# Patient Record
Sex: FEMALE | Race: WHITE | URBAN - METROPOLITAN AREA
[De-identification: names, ages, dates, MRNs, and addresses within clinical notes are randomized per-mention and may not be internally consistent; named-entity substitution may affect disease eponyms.]

---

## 2017-05-11 ENCOUNTER — APPOINTMENT (OUTPATIENT)
Dept: URBAN - METROPOLITAN AREA SURGERY 9 | Age: 62
Setting detail: DERMATOLOGY
End: 2017-05-11

## 2017-05-11 DIAGNOSIS — D22 MELANOCYTIC NEVI: ICD-10-CM

## 2017-05-11 DIAGNOSIS — L82.1 OTHER SEBORRHEIC KERATOSIS: ICD-10-CM

## 2017-05-11 DIAGNOSIS — D18.0 HEMANGIOMA: ICD-10-CM

## 2017-05-11 PROBLEM — E78.5 HYPERLIPIDEMIA, UNSPECIFIED: Status: ACTIVE | Noted: 2017-05-11

## 2017-05-11 PROBLEM — D18.01 HEMANGIOMA OF SKIN AND SUBCUTANEOUS TISSUE: Status: ACTIVE | Noted: 2017-05-11

## 2017-05-11 PROBLEM — M12.9 ARTHROPATHY, UNSPECIFIED: Status: ACTIVE | Noted: 2017-05-11

## 2017-05-11 PROBLEM — D22.5 MELANOCYTIC NEVI OF TRUNK: Status: ACTIVE | Noted: 2017-05-11

## 2017-05-11 PROBLEM — L20.84 INTRINSIC (ALLERGIC) ECZEMA: Status: ACTIVE | Noted: 2017-05-11

## 2017-05-11 PROCEDURE — OTHER COUNSELING: OTHER

## 2017-05-11 PROCEDURE — 99202 OFFICE O/P NEW SF 15 MIN: CPT

## 2017-05-11 PROCEDURE — OTHER OTHER: OTHER

## 2017-05-11 PROCEDURE — OTHER REASSURANCE: OTHER

## 2017-05-11 ASSESSMENT — LOCATION SIMPLE DESCRIPTION DERM
LOCATION SIMPLE: LEFT BUTTOCK
LOCATION SIMPLE: LEFT THIGH
LOCATION SIMPLE: UPPER BACK
LOCATION SIMPLE: LOWER BACK
LOCATION SIMPLE: RIGHT THIGH

## 2017-05-11 ASSESSMENT — LOCATION DETAILED DESCRIPTION DERM
LOCATION DETAILED: SUPERIOR THORACIC SPINE
LOCATION DETAILED: INFERIOR LUMBAR SPINE
LOCATION DETAILED: LEFT BUTTOCK
LOCATION DETAILED: LEFT ANTERIOR DISTAL THIGH
LOCATION DETAILED: RIGHT ANTERIOR MEDIAL PROXIMAL THIGH

## 2017-05-11 ASSESSMENT — LOCATION ZONE DERM
LOCATION ZONE: LEG
LOCATION ZONE: TRUNK

## 2017-05-11 NOTE — PROCEDURE: OTHER
Detail Level: Simple
Other (Free Text): traumatized
Note Text (......Xxx Chief Complaint.): This diagnosis correlates with the cysts on scalp